# Patient Record
Sex: FEMALE | Race: WHITE
[De-identification: names, ages, dates, MRNs, and addresses within clinical notes are randomized per-mention and may not be internally consistent; named-entity substitution may affect disease eponyms.]

---

## 2017-01-11 ENCOUNTER — HOSPITAL ENCOUNTER (OUTPATIENT)
Dept: HOSPITAL 45 - C.GI | Age: 72
Discharge: HOME | End: 2017-01-11
Attending: INTERNAL MEDICINE
Payer: COMMERCIAL

## 2017-01-11 VITALS
WEIGHT: 150.31 LBS | HEIGHT: 64.02 IN | WEIGHT: 150.31 LBS | BODY MASS INDEX: 25.66 KG/M2 | HEIGHT: 64.02 IN | BODY MASS INDEX: 25.66 KG/M2

## 2017-01-11 VITALS — SYSTOLIC BLOOD PRESSURE: 117 MMHG | HEART RATE: 83 BPM | OXYGEN SATURATION: 97 % | DIASTOLIC BLOOD PRESSURE: 55 MMHG

## 2017-01-11 DIAGNOSIS — I10: ICD-10-CM

## 2017-01-11 DIAGNOSIS — K21.9: Primary | ICD-10-CM

## 2017-01-11 DIAGNOSIS — Z86.718: ICD-10-CM

## 2017-01-11 DIAGNOSIS — K22.2: ICD-10-CM

## 2017-01-11 DIAGNOSIS — K44.9: ICD-10-CM

## 2017-01-11 NOTE — ENDO HISTORY AND PHYSICAL
History & Physical


Date of Service:


Jan 11, 2017.


Chief Complaint:


Reflux


Referring Physician:


Dr. Swift


History of Present Illness


72 yo CF who presents for EGD secondary to GERD.





Past Surgical History


Hx Cardiac Surgery:  No


Hx Internal Defibrillator:  No


Hx Pacemaker:  No


Hx Abdominal Surgery:  No


Hx of Implantable Prosthesis:  No


Hx Post-Op Nausea and Vomiting:  No


Hx Cancer Surgery:  No


Hx Thoracic Surgery:  No


Hx Orthopedic:  No


Hx Urinary Tract Surgery:  No





Family History


None





Social History


Smoking Status:  Never Smoker


Hx Substance Use:  No


Hx Alcohol Use:  No





Allergies


Coded Allergies:  


     Ephedrine (Verified  Adverse Reaction, Unknown, CAUSES BLOOD PRESSURE TO 

GO UP, 1/10/17)





Current Medications





 Reported Home Medications








 Medications  Dose


 Route/Sig


 Max Daily Dose Days Date Category Dose


Instructions


 


 Simbrinza


  (Brinzolamide-Brimonidine


 Tartr) 1 Krysta Krysta  1 Drop


 OPL BID


    1/10/17 Reported 


 


 Zantac


  (Ranitidine HCl)


 150 Mg Tab  150 Mg


 PO BID


    1/10/17 Reported 


 


 Magnesium 250 mg


  (Magnesium) 1 Tab


 Tab  1 Tab


 PO QAM


    1/10/17 Reported 


 


 Zocor


  (Simvastatin) 20


 Mg Tab  20 Mg


 PO QPM


   90 11/17/16 Reported 


 


 Jantoven


  (Warfarin Sodium)


 2 Mg Tab  1 Mg


 PO 3XWK


    12/14/15 Reported 


 


 Coumadin


  (Warfarin Sodium)


 2 Mg Tab  2 Mg


 PO 4XWK


    12/8/15 Reported 


 


 Calcium 600 + D


  (Calcium


 Carbonate-Vitamin


 D) 1 Tab Tab  1 Tabs


 PO BID


    11/27/13 Reported 


 


 Cozaar (Losartan


 Potassium) 50 Mg


 Tab  50 Mg


 PO BID


    11/27/13 Reported  WILL TAKE IN PM DEPENDING ON BP











Vital Signs


Weight (Kilograms):  68.18


Height (Feet):  5


Height (Inches):  4











  Date Time  Temp Pulse Resp B/P Pulse Ox O2 Delivery O2 Flow Rate FiO2


 


1/11/17 11:43 36.7 96 20 165/69 95 Room Air  











Physical Exam


General Appearance:  WD/WN, no apparent distress


Respiratory/Chest:  


   Auscultation:  breath sounds normal


Cardiovascular:  


   Heart Auscultation:  RRR


Abdomen:  


   Bowel Sounds:  normal


   Inspection & Palpation:  soft, non-distended, no tenderness, guarding & 

rebound





Assessment and Plan


Assessment:


72 yo CF who presents for EGD secondary to GERD.








Plan:


Proceed with EGD.

## 2017-01-11 NOTE — GI REPORT
Procedure Date: 1/11/2017 12:14 PM

Procedure:            Upper GI endoscopy

Indications:          Follow-up of gastro-esophageal reflux disease

Medicines:            Monitored Anesthesia Care

Complications:        No immediate complications.

Estimated Blood Loss: Estimated blood loss: none.

Procedure:            Pre-Anesthesia Assessment:

                      - Prior to the procedure, a History and Physical was 

                      performed, and patient medications and allergies were 

                      reviewed. The patient's tolerance of previous 

                      anesthesia was also reviewed. The risks and benefits of 

                      the procedure and the sedation options and risks were 

                      discussed with the patient. All questions were 

                      answered, and informed consent was obtained. Prior 

                      Anticoagulants: The patient has taken Coumadin 

                      (warfarin), last dose was 5 days prior to procedure. 

                      ASA Grade Assessment: III - A patient with severe 

                      systemic disease. After reviewing the risks and 

                      benefits, the patient was deemed in satisfactory 

                      condition to undergo the procedure.

                      After obtaining informed consent, the endoscope was 

                      passed under direct vision. Throughout the procedure, 

                      the patient's blood pressure, pulse, and oxygen 

                      saturations were monitored continuously. The Scope was 

                      introduced through the mouth, and advanced to the 

                      second part of duodenum. The upper GI endoscopy was 

                      accomplished without difficulty. The patient tolerated 

                      the procedure well.

Findings:

     A mild Schatzki ring (acquired) was found at the gastroesophageal 

     junction.

     A small hiatus hernia was present.

     The examined duodenum was normal.

Impression:           - Mild Schatzki ring.

                      - Small hiatus hernia.

                      - Normal examined duodenum.

                      - No specimens collected.

Recommendation:       - Resume previous diet.

                      - Continue present medications.

                      - Repeat the upper endoscopy PRN for retreatment.

                      - Return to primary care physician as previously 

                      scheduled.

Christoph Martinez, DO

1/11/2017 12:43:24 PM

This report has been signed electronically.

Note Initiated On: 1/11/2017 12:14 PM

## 2017-01-11 NOTE — ANESTHESIOLOGY PROGRESS NOTE
Anesthesia Post Op Note


Date & Time


Jan 11, 2017 at 14:05





Vital Signs


Pain Intensity:  0





 Vital Signs Past 12 Hours








  Date Time  Temp Pulse Resp B/P Pulse Ox O2 Delivery O2 Flow Rate FiO2


 


1/11/17 13:28  83 18 117/55 97 Room Air  


 


1/11/17 13:17  85 18 110/58 97 Room Air  


 


1/11/17 13:02  90 18 115/55 97 Nasal Cannula 2 


 


1/11/17 12:48  92 18 114/54 95 Nasal Cannula 2 


 


1/11/17 11:43 36.7 96 20 165/69 95 Room Air  











Notes


Mental Status:  alert / awake / arousable, participated in evaluation


Pt Amnestic to Procedure:  Yes


Nausea / Vomiting:  adequately controlled


Pain:  adequately controlled


Airway Patency, RR, SpO2:  stable & adequate


BP & HR:  stable & adequate


Hydration State:  stable & adequate


Anesthetic Complications:  no major complications apparent

## 2017-01-20 ENCOUNTER — HOSPITAL ENCOUNTER (OUTPATIENT)
Dept: HOSPITAL 45 - C.LABSPEC | Age: 72
Discharge: HOME | End: 2017-01-20
Attending: FAMILY MEDICINE
Payer: COMMERCIAL

## 2017-01-20 ENCOUNTER — HOSPITAL ENCOUNTER (OUTPATIENT)
Dept: HOSPITAL 45 - C.RAD1850 | Age: 72
Discharge: HOME | End: 2017-01-20
Attending: FAMILY MEDICINE
Payer: COMMERCIAL

## 2017-01-20 DIAGNOSIS — R10.9: ICD-10-CM

## 2017-01-20 DIAGNOSIS — R05: ICD-10-CM

## 2017-01-20 DIAGNOSIS — R39.9: Primary | ICD-10-CM

## 2017-01-20 DIAGNOSIS — R10.9: Primary | ICD-10-CM

## 2017-01-20 LAB
APPEARANCE UR: CLEAR
BILIRUB UR-MCNC: (no result) MG/DL
COLOR UR: YELLOW
MANUAL MICROSCOPIC REQUIRED?: NO
NITRITE UR QL STRIP: (no result)
PH UR STRIP: 5.5 [PH] (ref 4.5–7.5)
REVIEW REQ?: NO
SP GR UR STRIP: 1.02 (ref 1–1.03)
UROBILINOGEN UR-MCNC: (no result) MG/DL
ZZUR CULT IF INDIC CLEAN CATCH: NO

## 2017-01-20 NOTE — DIAGNOSTIC IMAGING REPORT
CHEST 2 VIEWS ROUTINE



HISTORY:      R05 [id number] Acute flank pain



COMPARISON: Chest 12/24/2015.



FINDINGS: The lungs are clear. The heart is normal in size. No pneumothorax.

There is blunting of the bilateral costophrenic sulci posteriorly. Degenerative

changes within the thoracic spine.



IMPRESSION:



1. No focal lung consolidations.

2. Blunting of the bilateral posterior costophrenic sulci. This favors trace

bilateral pleural effusions.







Electronically signed by:  Dickson Hollis M.D.

1/20/2017 12:25 PM



Dictated Date/Time:  1/20/2017 12:23 PM

## 2017-06-10 ENCOUNTER — HOSPITAL ENCOUNTER (EMERGENCY)
Dept: HOSPITAL 45 - C.EDB | Age: 72
Discharge: HOME | End: 2017-06-10
Payer: COMMERCIAL

## 2017-06-10 VITALS — SYSTOLIC BLOOD PRESSURE: 136 MMHG | DIASTOLIC BLOOD PRESSURE: 68 MMHG | HEART RATE: 74 BPM

## 2017-06-10 VITALS — OXYGEN SATURATION: 97 %

## 2017-06-10 VITALS
BODY MASS INDEX: 24.61 KG/M2 | WEIGHT: 147.71 LBS | HEIGHT: 65 IN | HEIGHT: 65 IN | WEIGHT: 147.71 LBS | BODY MASS INDEX: 24.61 KG/M2

## 2017-06-10 VITALS — TEMPERATURE: 98.06 F

## 2017-06-10 DIAGNOSIS — Z79.01: ICD-10-CM

## 2017-06-10 DIAGNOSIS — Z51.81: ICD-10-CM

## 2017-06-10 DIAGNOSIS — F41.9: ICD-10-CM

## 2017-06-10 DIAGNOSIS — I51.9: ICD-10-CM

## 2017-06-10 DIAGNOSIS — I10: Primary | ICD-10-CM

## 2017-06-10 DIAGNOSIS — I26.99: ICD-10-CM

## 2017-06-10 DIAGNOSIS — Z82.49: ICD-10-CM

## 2017-06-10 LAB
ALBUMIN/GLOB SERPL: 1.1 {RATIO} (ref 0.9–2)
ALP SERPL-CCNC: 88 U/L (ref 45–117)
ALT SERPL-CCNC: 28 U/L (ref 12–78)
ANION GAP SERPL CALC-SCNC: 10 MMOL/L (ref 3–11)
AST SERPL-CCNC: 14 U/L (ref 15–37)
BASOPHILS # BLD: 0.05 K/UL (ref 0–0.2)
BASOPHILS NFR BLD: 0.8 %
BUN SERPL-MCNC: 16 MG/DL (ref 7–18)
BUN/CREAT SERPL: 15 (ref 10–20)
CALCIUM SERPL-MCNC: 9.3 MG/DL (ref 8.5–10.1)
CHLORIDE SERPL-SCNC: 109 MMOL/L (ref 98–107)
CO2 SERPL-SCNC: 24 MMOL/L (ref 21–32)
COMPLETE: YES
CREAT CL PREDICTED SERPL C-G-VRATE: 41 ML/MIN
CREAT SERPL-MCNC: 1.1 MG/DL (ref 0.6–1.2)
EOSINOPHIL NFR BLD AUTO: 253 K/UL (ref 130–400)
GLOBULIN SER-MCNC: 3.8 GM/DL (ref 2.5–4)
GLUCOSE SERPL-MCNC: 104 MG/DL (ref 70–99)
HCT VFR BLD CALC: 44 % (ref 37–47)
IG%: 0.3 %
IMM GRANULOCYTES NFR BLD AUTO: 36.7 %
INR PPP: 2.3 (ref 0.9–1.1)
LYMPHOCYTES # BLD: 2.21 K/UL (ref 1.2–3.4)
MCH RBC QN AUTO: 29.8 PG (ref 25–34)
MCHC RBC AUTO-ENTMCNC: 33.9 G/DL (ref 32–36)
MCV RBC AUTO: 88 FL (ref 80–100)
MONOCYTES NFR BLD: 8.5 %
NEUTROPHILS # BLD AUTO: 0.7 %
NEUTROPHILS NFR BLD AUTO: 53 %
PARTIAL THROMBOPLASTIN RATIO: 1.4
PMV BLD AUTO: 10.2 FL (ref 7.4–10.4)
POTASSIUM SERPL-SCNC: 3.7 MMOL/L (ref 3.5–5.1)
PROTHROMBIN TIME: 25.3 SECONDS (ref 9–12)
RBC # BLD AUTO: 5 M/UL (ref 4.2–5.4)
SODIUM SERPL-SCNC: 143 MMOL/L (ref 136–145)
WBC # BLD AUTO: 6.02 K/UL (ref 4.8–10.8)

## 2017-06-10 NOTE — EMERGENCY ROOM VISIT NOTE
History


Report prepared by Apple:  Carissa Bar


Under the Supervision of:  Dr. Ryan Friedman M.D.


First contact with patient:  11:04


Chief Complaint:  HYPERTENSION


Stated Complaint:  FLUCTUATIONS OF BLOOD PRESSURE, PRESSURE IN HEAD





History of Present Illness


The patient is a 72 year old female who presents to the Emergency Room with 

complaints of waxing and waning hypertension for the past week.  The patient 

reports a history of hypertension, noting that she takes Cozaar for her 

hypertension.  She states that over the last week her levels have been 

fluctuating.  The patient additionally associates weakness, fatigue, and head 

pressure with her symptoms today.  She additionally reports that she has a 

history of PE and DVTs, noting that she is on Coumadin.  The patient expresses 

concern for a new blood clot, and is unsure if that is why her blood pressure 

has risen.  She reports that typically can get her blood pressure under 

control.  The patient reports that on May 23rd she had a blood pressure of 200 

mmHg systolically.  She states that today it has fluctuated between 100 mmHg 

and 150 mmHg.  The patient reports normal bowel movements.  She associates back 

pain for the past few days.





   Source of History:  patient


   Onset:  past week


   Position:  other (global)


   Symptom Intensity:  100-150 mmHg


   Quality:  other (hypertension)


   Timing:  waxes/wanes


   Associated Symptoms:  + headache (pressure), + back pain, + fatigue, + 

weakness





Review of Systems


All systems have been listed, reviewed, and are negative other than those 

previously mentioned. Please see Additional Medical History Sheet.





Past Medical & Surgical


Medical Problems:


(1) Bilateral pulmonary embolism


(2) Heart disease


(3) Hypertension








Family History





FHx: heart disease


Hypertension





Social History


Smoking Status:  Never Smoker


Smokeless Tobacco Use:  No


Alcohol Use:  none


Marital Status:  


Housing Status:  lives with family


Occupation Status:  retired





Current/Historical Medications


Scheduled


Brinzolamide-Brimonidine Tartr (Simbrinza), 1 DROP OPL BID


Calcium Carbonate-Vitamin D (Calcium 600 + D), 1 TABS PO BID


Cholecalciferol (Vitamin D), 1 TAB PO BID


Losartan Potassium (Cozaar), 50 MG PO BID


Magnesium (Magnesium 250 mg), 1 TAB PO QAM


Ranitidine (Zantac), 150 MG PO BID


Ranitidine HCl (Ranitidine HCl), 150 MG PO BID


Simvastatin (Zocor), 20 MG PO QPM


Warfarin Sodium (Coumadin), 2 MG PO 4XWK


Warfarin Sodium (Warfarin Sodium), 1 MG PO 3XWK





Allergies


Coded Allergies:  


     Ephedrine (Verified  Adverse Reaction, Unknown, CAUSES BLOOD PRESSURE TO 

GO UP, 6/10/17)





Physical Exam


Vital Signs











  Date Time  Temp Pulse Resp B/P (MAP) Pulse Ox O2 Delivery O2 Flow Rate FiO2


 


6/10/17 13:33  74 14 136/68    


 


6/10/17 13:18  70 16     


 


6/10/17 13:03  65 15     


 


6/10/17 13:02    133/70    


 


6/10/17 12:48  74 25     


 


6/10/17 12:43  80 13     


 


6/10/17 12:28  81 22  97   


 


6/10/17 12:13  80 17  97   


 


6/10/17 12:02    138/85    


 


6/10/17 11:58  77 23  98   


 


6/10/17 11:28  81 19     


 


6/10/17 11:23    154/96    


 


6/10/17 11:20  91 17     


 


6/10/17 11:05  85 19     


 


6/10/17 10:50  91 14     


 


6/10/17 10:40    150/88    


 


6/10/17 10:35  93 15     


 


6/10/17 10:24  103      


 


6/10/17 10:20  105 14     


 


6/10/17 10:11 36.7 112 17 140/75 99 Room Air  











Physical Exam


GENERAL: Patient awake, alert, oriented x 3.  Patient is very anxious.  Patient 

follows commands.  Patient does not appear toxic.  Patient is adequately 

hydrated and well-nourished.  


SKIN: No erythema, pallor, cyanosis or rash


HEENT: Normal head, pupils equal, reactive to light and accommodation.  Ears 

normal.  Oral cavity and posterior pharynx appear normal.  Neck: Without 

adenopathy, no neck vein distention.


LUNGS: Clear to auscultation.  No wheezes, no rales, no rhonchi.


HEART:  No murmurs. No gallops. No rubs


ABDOMEN: No masses, no rebound, no hepatomegaly or splenomegaly.


BACK: Left upper back pain to palpation.


EXTREMITIES: No signs of trauma.  No pedal or pretibial edema.  No calf or 

thigh tenderness.


NEUROLOGIC: Cranial nerves II-XII within normal limits.  No gross motor sensory 

function deficits.


PSYCH: Patient is very anxious.





Medical Decision & Procedures


ER Provider


Diagnostic Interpretation:


X ray results are stated below per my interpretation and the radiologist's 

interpretation. 





CHEST 2 VIEWS ROUTINE





CLINICAL HISTORY: Left upper back pain.    





COMPARISON STUDY:  Chest radiograph January 20, 2017.





FINDINGS: Lung volumes are normal. There is no pneumothorax or pleural effusion.


Cardiac size is normal. There is no evidence of pulmonary edema. Blunting of the


bilateral costophrenic angles suggests trace bilateral pleural effusions. There


is no consolidation. 





IMPRESSION:  





1. No consolidation to suggest pneumonia. 





2. No pneumothorax.





3. Suspected trace bilateral pleural effusions.





Electronically signed by:  Arian Alamo M.D.


6/10/2017 12:10 PM





Dictated Date/Time:  6/10/2017 12:08 PM





Laboratory Results


6/10/17 10:38








Red Blood Count 5.00, Mean Corpuscular Volume 88.0, Mean Corpuscular Hemoglobin 

29.8, Mean Corpuscular Hemoglobin Concent 33.9, Mean Platelet Volume 10.2, 

Neutrophils (%) (Auto) 53.0, Lymphocytes (%) (Auto) 36.7, Monocytes (%) (Auto) 

8.5, Eosinophils (%) (Auto) 0.7, Basophils (%) (Auto) 0.8, Neutrophils # (Auto) 

3.19, Lymphocytes # (Auto) 2.21, Monocytes # (Auto) 0.51, Eosinophils # (Auto) 

0.04, Basophils # (Auto) 0.05





6/10/17 10:38

















Test


  6/10/17


10:38


 


White Blood Count


  6.02 K/uL


(4.8-10.8)


 


Red Blood Count


  5.00 M/uL


(4.2-5.4)


 


Hemoglobin


  14.9 g/dL


(12.0-16.0)


 


Hematocrit 44.0 % (37-47) 


 


Mean Corpuscular Volume


  88.0 fL


()


 


Mean Corpuscular Hemoglobin


  29.8 pg


(25-34)


 


Mean Corpuscular Hemoglobin


Concent 33.9 g/dl


(32-36)


 


Platelet Count


  253 K/uL


(130-400)


 


Mean Platelet Volume


  10.2 fL


(7.4-10.4)


 


Neutrophils (%) (Auto) 53.0 % 


 


Lymphocytes (%) (Auto) 36.7 % 


 


Monocytes (%) (Auto) 8.5 % 


 


Eosinophils (%) (Auto) 0.7 % 


 


Basophils (%) (Auto) 0.8 % 


 


Neutrophils # (Auto)


  3.19 K/uL


(1.4-6.5)


 


Lymphocytes # (Auto)


  2.21 K/uL


(1.2-3.4)


 


Monocytes # (Auto)


  0.51 K/uL


(0.11-0.59)


 


Eosinophils # (Auto)


  0.04 K/uL


(0-0.5)


 


Basophils # (Auto)


  0.05 K/uL


(0-0.2)


 


RDW Standard Deviation


  44.4 fL


(36.4-46.3)


 


RDW Coefficient of Variation


  13.7 %


(11.5-14.5)


 


Immature Granulocyte % (Auto) 0.3 % 


 


Immature Granulocyte # (Auto)


  0.02 K/uL


(0.00-0.02)


 


Prothrombin Time


  25.3 SECONDS


(9.0-12.0)


 


Prothromb Time International


Ratio 2.3 (0.9-1.1) 


 


 


Activated Partial


Thromboplast Time 36.2 SECONDS


(21.0-31.0)


 


Partial Thromboplastin Ratio 1.4 


 


Anion Gap


  10.0 mmol/L


(3-11)


 


Est Creatinine Clear Calc


Drug Dose 41.0 ml/min 


 


 


Estimated GFR (


American) 57.7 


 


 


Estimated GFR (Non-


American 49.8 


 


 


BUN/Creatinine Ratio 15.0 (10-20) 


 


Calcium Level


  9.3 mg/dl


(8.5-10.1)


 


Total Bilirubin


  1.5 mg/dl


(0.2-1)


 


Aspartate Amino Transf


(AST/SGOT) 14 U/L (15-37) 


 


 


Alanine Aminotransferase


(ALT/SGPT) 28 U/L (12-78) 


 


 


Alkaline Phosphatase


  88 U/L


()


 


Troponin I


  < 0.015 ng/ml


(0-0.045)


 


Total Protein


  7.9 gm/dl


(6.4-8.2)


 


Albumin


  4.1 gm/dl


(3.4-5.0)


 


Globulin


  3.8 gm/dl


(2.5-4.0)


 


Albumin/Globulin Ratio 1.1 (0.9-2) 








Laboratory results as stated above per my review.





ECG


Indication:  other (hypertension)


Rate (beats per minute):  99


Rhythm:  normal sinus


Findings:  no acute ischemic change, no ectopy





ED Course


1105: Past medical records reviewed. The patient was evaluated in room B10. A 

complete history and physical examination was performed. 





1235: I reevaluated the patient and she is still very anxious.  I discussed the 

exam findings with her and her daughter and I discussed the treatment plan.  

They verbalized complete understanding and agreement.  The patient is ready to 

go home.





Medical Decision


Nurses notes reviewed. Medical history sheet reviewed. Differential diagnosis 

includes but is not limited to: anxiety, hypertension, history of DVT and PE.





The patient is extremely anxious.  She has been adjusting her losartan and 

diuretic according to her blood pressure.  Testing today including multiple 

blood tests, EKG and imaging did not reveal any significant pathology.  She 

does have some very small bilateral pleural effusions which I do not believe 

require treatment.  I have attempted to reassure the patient.  I told the 

patient that she should take the same antihypertensive and diuretic daily 

unless she has significant peripheral edema.  The patient was also encouraged 

to follow-up with her family physician.





Blood Pressure Screening: Patient was found to have an elevated blood pressure 

and was referred to their primary doctor for recheck and further treatment.





Medication Reconciliation: I attest that I have personally reviewed the patient'

s current medication list.





Impression





 Primary Impression:  


 Hypertension


 Additional Impression:  


 Anxiety





Scribe Attestation


The scribe's documentation has been prepared under my direction and personally 

reviewed by me in its entirety. I confirm that the note above accurately 

reflects all work, treatment, procedures, and medical decision making performed 

by me.





Departure Information


Dispostion


Home / Self-Care





Referrals


Black Swift M.D. (PCP)





Forms


HOME CARE DOCUMENTATION FORM,                                                 

               IMPORTANT VISIT INFORMATION





Patient Instructions


My [facility]





Additional Instructions





Take the same amount of losartan and diuretic on a daily basis.


Follow-up with Dr. Swift within the next 10 days.





Problem Qualifiers

## 2017-06-10 NOTE — DIAGNOSTIC IMAGING REPORT
CHEST 2 VIEWS ROUTINE



CLINICAL HISTORY: Left upper back pain.    



COMPARISON STUDY:  Chest radiograph January 20, 2017.



FINDINGS: Lung volumes are normal. There is no pneumothorax or pleural effusion.

Cardiac size is normal. There is no evidence of pulmonary edema. Blunting of the

bilateral costophrenic angles suggests trace bilateral pleural effusions. There

is no consolidation. 



IMPRESSION:  



1. No consolidation to suggest pneumonia. 



2. No pneumothorax.



3. Suspected trace bilateral pleural effusions.









Electronically signed by:  Arian Alamo M.D.

6/10/2017 12:10 PM



Dictated Date/Time:  6/10/2017 12:08 PM

## 2017-09-22 ENCOUNTER — HOSPITAL ENCOUNTER (EMERGENCY)
Dept: HOSPITAL 45 - C.EDB | Age: 72
Discharge: HOME | End: 2017-09-22
Payer: COMMERCIAL

## 2017-09-22 VITALS
HEIGHT: 64.02 IN | HEIGHT: 64.02 IN | BODY MASS INDEX: 26.16 KG/M2 | WEIGHT: 153.22 LBS | WEIGHT: 153.22 LBS | BODY MASS INDEX: 26.16 KG/M2

## 2017-09-22 VITALS — OXYGEN SATURATION: 98 % | SYSTOLIC BLOOD PRESSURE: 167 MMHG | HEART RATE: 84 BPM | DIASTOLIC BLOOD PRESSURE: 99 MMHG

## 2017-09-22 VITALS — TEMPERATURE: 97.88 F

## 2017-09-22 DIAGNOSIS — I10: ICD-10-CM

## 2017-09-22 DIAGNOSIS — L08.9: Primary | ICD-10-CM

## 2017-09-22 DIAGNOSIS — Z79.01: ICD-10-CM

## 2017-09-22 DIAGNOSIS — Z86.711: ICD-10-CM

## 2017-09-22 DIAGNOSIS — Z82.49: ICD-10-CM

## 2017-09-22 LAB
LYME DISEASE AB IGG: (no result)
LYME DISEASE AB IGM: (no result)

## 2017-09-22 NOTE — EMERGENCY ROOM VISIT NOTE
History


Report prepared by Apple:  Carissa Bar


Under the Supervision of:  AARON GrimesO.


First contact with patient:  09:33


Chief Complaint:  BITE


Stated Complaint:  INSECT BITE/ITCHING/PAINFUL





History of Present Illness


The patient is a 72 year old female who presents to the Emergency Room with 

complaints of a sudden insect bite to her right leg that occurred several days 

ago.  The patient reports that she saw the bite shadi on Tuesday and states that 

she developed a rash and pain to the area.  The patient states that she is 

concerned about Lyme Disease.  She states that she does not know what bit her.  

The patient states that she takes Coumadin, noting that her INR yesterday was 

2.1.  Pt denies headache, change in vision, fevers, chest pain, shortness of 

breath, nausea, vomiting, diarrhea, and pain with urination.





   Source of History:  patient


   Onset:  several days ago


   Position:  leg (right)


   Quality:  other (insect bite)


   Timing:  other (sudden)


   Associated Symptoms:  + rash





Review of Systems


See HPI for pertinent positives & negatives. A total of 10 systems reviewed and 

were otherwise negative.





Past Medical & Surgical


Medical Problems:


(1) Bilateral pulmonary embolism


(2) Heart disease


(3) Hypertension








Family History





FHx: heart disease


Hypertension





Social History


Smoking Status:  Never Smoker


Alcohol Use:  none


Marital Status:  


Housing Status:  lives with family


Occupation Status:  retired





Current/Historical Medications


Scheduled


Brinzolamide-Brimonidine Tartr (Simbrinza), 1 DROP OPL BID


Calcium Carbonate-Vitamin D (Calcium 600 + D), 1 TABS PO BID


Cholecalciferol (Vitamin D), 1 TAB PO BID


Magnesium (Magnesium 250 mg), 1 TAB PO QAM


Ranitidine (Zantac), 150 MG PO BID


Simvastatin (Zocor), 20 MG PO QPM


Warfarin Sodium (Coumadin), 2 MG PO 3XWK


Warfarin Sodium (Warfarin Sodium), 1 MG PO 4XWK





Allergies


Coded Allergies:  


     Ephedrine (Verified  Adverse Reaction, Unknown, CAUSES BLOOD PRESSURE TO 

GO UP, 9/22/17)





Physical Exam


Vital Signs











  Date Time  Temp Pulse Resp B/P (MAP) Pulse Ox O2 Delivery O2 Flow Rate FiO2


 


9/22/17 11:41  84 18 167/99 98 Room Air  


 


9/22/17 10:31  86 18 151/91 97 Room Air  


 


9/22/17 09:25 36.6 110 17 171/104 98 Room Air  











Physical Exam


GENERAL: alert, well appearing, well nourished, no distress, non-toxic 


LUNGS: Clear to auscultation. Normal chest wall mechanics


HEART: no murmurs, S1 normal and S2 normal 


ABDOMEN: abdomen soft, non-tender, normo-active bowel sounds, no masses, no 

rebound or guarding. 


SKIN: At the right posterior popliteal fossa there is a 1.5 cm x 1.5 cm area of 

erythema with a central pustule.


UPPER EXTREMITIES: upper extremities are grossly normal. 


LOWER EXTREMITIES: No pitting edema.


NEURO EXAM: Normal sensorium.





Medical Decision & Procedures


Laboratory Results











Test


  9/22/17


10:29


 


Lyme Disease IgG Antibody NEG (NEG) 


 


Lyme Disease IgM Antibody NEG (NEG) 








Laboratory results per my review.





ED Course


ED COURSE: 


Vital signs were reviewed and showed hypertensive


The patients medical record was reviewed


The above diagnostic studies were performed and reviewed.


ED treatments and interventions as stated above. 





0944: The patient was evaluated in room B5. A complete history and physical 

examination was performed.





1202: Upon reevaluation, the patient is resting comfortably.I discussed my 

findings with the patient and she understands and agrees with the treatment 

plan.   


Based on the patients age, coexisting illnesses, exam and lab findings the 

decision to treat as an outpatient was made.


The patient remained stable while under my care.


The patient appeared well at the time of discharge.





Medical Decision


Differential diagnosis includes etiologies such as cellulitis, abscess, MRSA 

infection, DVT, necrotizing fasciitis, dermatitis, drug eruption, as well as 

others were entertained.





Patient is a 72-year-old female that presents to ER for erythema and concern of 

a tick bite in her right popliteal fossa.  On exam he does appear to be a 

pustule.  Lyme was obtained and was negative.  Pustule was broken open.  She is 

discharged follow-up with her PCP.  There is no significant surrounding 

erythema without no benefit of antibiotics at this time. Discussed with Pt 

concerning signs and symptoms to watch out for. Pt was instructed to follow up 

with their PCP and discussed with the patient their option to return to the ED 

at anytime for persistent or worsening symptoms. The appropriate anticipatory 

guidance and out-patient management, including indications for return to the 

emergency department, were explained at length to the patient and understood.





Medication Reconcilliation


Current Medication List:  was personally reviewed by me





Blood Pressure Screening


Patient's blood pressure:  Elevated blood pressure


Blood pressure disposition:  Elevated BP felt to be situational, Did not 

require urgent referral





Impression





 Primary Impression:  


 Pustule





Scribe Attestation


The scribe's documentation has been prepared under my direction and personally 

reviewed by me in its entirety. I confirm that the note above accurately 

reflects all work, treatment, procedures, and medical decision making performed 

by me.





Departure Information


Dispostion


Home / Self-Care





Referrals


[facility] M.D. (PCP)





Forms


HOME CARE DOCUMENTATION FORM,                                                 

               IMPORTANT VISIT INFORMATION





Patient Instructions


ED Cyst Sebaceous Infec Ke[facility], My [facility]





Additional Instructions





You were found to have a small skin pustule which did open up.  His keep this 

clean dry.  If you have any surrounding spreading of the redness, fevers or any 

other concerning signs or symptoms please return to the ER immediately.

## 2017-10-05 ENCOUNTER — HOSPITAL ENCOUNTER (OUTPATIENT)
Dept: HOSPITAL 45 - C.ULTR | Age: 72
Discharge: HOME | End: 2017-10-05
Attending: PHYSICIAN ASSISTANT
Payer: COMMERCIAL

## 2017-10-05 DIAGNOSIS — R17: Primary | ICD-10-CM

## 2017-10-05 NOTE — DIAGNOSTIC IMAGING REPORT
ABDOMINAL ULTRASOUND, RIGHT UPPER QUADRANT



HISTORY:     Elevated bilirubin.



COMPARISON:  Abdominal ultrasound April 16, 2015, CT of the abdomen and pelvis

December 15, 2015 and MRI of the abdomen December 20, 2016.



FINDINGS: Liver morphology is normal. There is no biliary ductal dilatation. The

common bile duct measures 6 mm in caliber. There are are no gallstones. A few

small gallbladder polyps are noted. There is no gallbladder wall thickening. The

pancreatic body is normal. The head and tail are partially obscured. There is no

right hydronephrosis. A 9 mm right renal cyst is noted.



IMPRESSION: 





No gallstones or biliary ductal dilatation.







Electronically signed by:  Arian Alamo M.D.

10/5/2017 10:42 AM



Dictated Date/Time:  10/5/2017 10:40 AM

## 2017-10-13 ENCOUNTER — HOSPITAL ENCOUNTER (OUTPATIENT)
Dept: HOSPITAL 45 - C.NUCL | Age: 72
Discharge: HOME | End: 2017-10-13
Attending: PHYSICIAN ASSISTANT
Payer: COMMERCIAL

## 2017-10-13 ENCOUNTER — HOSPITAL ENCOUNTER (EMERGENCY)
Dept: HOSPITAL 45 - C.EDB | Age: 72
Discharge: HOME | End: 2017-10-13
Payer: COMMERCIAL

## 2017-10-13 VITALS — HEART RATE: 92 BPM | OXYGEN SATURATION: 98 % | SYSTOLIC BLOOD PRESSURE: 169 MMHG | DIASTOLIC BLOOD PRESSURE: 96 MMHG

## 2017-10-13 VITALS
HEIGHT: 64.02 IN | WEIGHT: 147.71 LBS | HEIGHT: 64.02 IN | WEIGHT: 147.71 LBS | BODY MASS INDEX: 25.22 KG/M2 | BODY MASS INDEX: 25.22 KG/M2

## 2017-10-13 VITALS — TEMPERATURE: 97.34 F

## 2017-10-13 DIAGNOSIS — R10.9: ICD-10-CM

## 2017-10-13 DIAGNOSIS — R93.2: ICD-10-CM

## 2017-10-13 DIAGNOSIS — I10: ICD-10-CM

## 2017-10-13 DIAGNOSIS — I49.3: Primary | ICD-10-CM

## 2017-10-13 DIAGNOSIS — Z86.711: ICD-10-CM

## 2017-10-13 DIAGNOSIS — Z79.01: ICD-10-CM

## 2017-10-13 DIAGNOSIS — R19.7: ICD-10-CM

## 2017-10-13 DIAGNOSIS — E80.6: Primary | ICD-10-CM

## 2017-10-13 LAB
ALP SERPL-CCNC: 86 U/L (ref 45–117)
ALT SERPL-CCNC: 32 U/L (ref 12–78)
ANION GAP SERPL CALC-SCNC: 6 MMOL/L (ref 3–11)
AST SERPL-CCNC: 26 U/L (ref 15–37)
BUN SERPL-MCNC: 16 MG/DL (ref 7–18)
BUN/CREAT SERPL: 14.5 (ref 10–20)
CALCIUM SERPL-MCNC: 9.5 MG/DL (ref 8.5–10.1)
CHLORIDE SERPL-SCNC: 106 MMOL/L (ref 98–107)
CO2 SERPL-SCNC: 27 MMOL/L (ref 21–32)
CREAT CL PREDICTED SERPL C-G-VRATE: 42.9 ML/MIN
CREAT SERPL-MCNC: 1.1 MG/DL (ref 0.6–1.2)
EOSINOPHIL NFR BLD AUTO: 276 K/UL (ref 130–400)
GLUCOSE SERPL-MCNC: 108 MG/DL (ref 70–99)
HCT VFR BLD CALC: 45.3 % (ref 37–47)
INR PPP: 3.4 (ref 0.9–1.1)
MCH RBC QN AUTO: 29.5 PG (ref 25–34)
MCHC RBC AUTO-ENTMCNC: 33.8 G/DL (ref 32–36)
MCV RBC AUTO: 87.3 FL (ref 80–100)
PARTIAL THROMBOPLASTIN RATIO: 1.6
PMV BLD AUTO: 10.4 FL (ref 7.4–10.4)
POTASSIUM SERPL-SCNC: 3.7 MMOL/L (ref 3.5–5.1)
PROTHROMBIN TIME: 38.8 SECONDS (ref 9–12)
RBC # BLD AUTO: 5.19 M/UL (ref 4.2–5.4)
SODIUM SERPL-SCNC: 139 MMOL/L (ref 136–145)
TSH SERPL-ACNC: 3.84 UIU/ML (ref 0.3–4.5)
WBC # BLD AUTO: 8.18 K/UL (ref 4.8–10.8)

## 2017-10-13 NOTE — DIAGNOSTIC IMAGING REPORT
NUCLEAR MEDICINE HEPATOBILIARY SCAN WITH GALLBLADDER EJECTION FRACTION



CLINICAL HISTORY:  Abdominal pain.



COMPARISON:  Right upper quadrant ultrasound October 5, 2017.



TECHNIQUE:  5.149 mCi of technetium 99m Choletec IV was injected at 10:50 AM on

October 13, 2017.  Immediately following injection, imaging of the abdomen was

carried out for 60 minutes in the anterior projection. At this time, 1.3 mcg of

Sincalide was injected IV as per protocol. Imaging was performed for an

additional 45 minutes to estimate a gallbladder ejection fraction.



FINDINGS:  Hepatic uptake of radiotracer is prompt and homogeneous. Activity is

identified within the common bile duct at 10 minutes. Small bowel activity is

first noted at 20 minutes. Gallbladder activity is first identified at 40

minutes. Gallbladder ejection fraction was estimated at 83%. This is within

normal limits. Normal is greater than 30-35%.



IMPRESSION:  



1. Normal hepatobiliary scan. No evidence of acute or chronic cholecystitis.



2. Normal gallbladder ejection fraction of 83%.







Electronically signed by:  Arian Alamo M.D.

10/13/2017 1:10 PM



Dictated Date/Time:  10/13/2017 1:07 PM

## 2017-10-13 NOTE — EMERGENCY ROOM VISIT NOTE
History


Report prepared by Apple:  Arnulfo Brooke


Under the Supervision of:  Dr. Gita Carbajal D.O.


First contact with patient:  01:13


Chief Complaint:  CARDIAC ASSESSMENT


Stated Complaint:  CARDIO ASSESSMENT





History of Present Illness


The patient is a 72 year old female who presents to the Emergency Room with 

complaints of a constant irregular heartbeat that began at 1700. She states 

that she saw her doctor today and her INR level was 4.6. The patient reports 

that she normally takes 300 mcg of Vitamin K three times a week. She states 

that her doctor gave her an additional 100 mcg due to her elevated INR level. 

She reports that she took her Vitamin K at 1600 and started to experience an 

irregular heartbeat an hour later. The patient admits to a history of pulmonary 

emboli. She states that she has been experiencing diarrhea for a while, but 

admits that it is not as severe currently as it was in the past. The patient 

admits to being on Coumadin. The patient denies chest pain, shortness of breath

, cough, cold, abdominal pain, and a history of a heart attack.





   Source of History:  patient


   Onset:  1700


   Position:  other (global)


   Quality:  other (irregular heartbeat)


   Timing:  constant


   Associated Symptoms:  + diarrhea, No cough, No chest pain, No SOB, No 

abdominal pain





Review of Systems


See HPI for pertinent positives & negatives. A total of 10 systems reviewed and 

were otherwise negative.





Past Medical & Surgical


Medical Problems:


(1) Bilateral pulmonary embolism


(2) Heart disease


(3) Hypertension








Family History





FHx: heart disease


Hypertension





Social History


Smoking Status:  Never Smoker


Alcohol Use:  none


Marital Status:  


Housing Status:  lives with family


Occupation Status:  retired





Current/Historical Medications


Scheduled


Brinzolamide-Brimonidine Tartr (Simbrinza), 1 DROP OPL BID


Calcium Carbonate-Vitamin D (Calcium 600 + D), 1 TABS PO BID


Cholecalciferol (Vitamin D), 1 TAB PO BID


Magnesium (Magnesium 250 mg), 1 TAB PO QAM


Phytonadione (Vitamin K), 100 MCG PO 3XWK


Ranitidine (Zantac), 150 MG PO BID


Simvastatin (Zocor), 20 MG PO QPM


Warfarin Sodium (Coumadin), 2 MG PO 2XWK


Warfarin Sodium (Warfarin Sodium), 1 MG PO 5XWK





Allergies


Coded Allergies:  


     Ephedrine (Verified  Adverse Reaction, Unknown, CAUSES BLOOD PRESSURE TO 

GO UP, 10/13/17)





Physical Exam


Vital Signs











  Date Time  Temp Pulse Resp B/P (MAP) Pulse Ox O2 Delivery O2 Flow Rate FiO2


 


10/13/17 02:57  92 18 169/96 98   


 


10/13/17 01:20  100      


 


10/13/17 01:10 36.3 79 18 194/105 99 Room Air  











Physical Exam


HEENT: Head - normocephalic and atraumatic   Pupils are equal, round, and 

reactive to light.  Extraocular eye muscles are intact, and sclera are 

anicteric.   Nose -  moist nasal mucosa without discharge. Mouth - moist buccal 

mucosa.  Oropharynx is nonerythematous and there is no tonsillar exudate or 

edema noted.


Neck: Supple; no JVD, nuchal rigidity, cervical lymphadenopathy.


Heart: Irregular heart beat.  Regular Rate.  a normal S1 and S2 with no murmurs

, clicks, or gallops appreciated.


Lungs: Clear to auscultation bilaterally with no wheezes, rales, or rhonchi.


Abdomen: Soft, completely nontender, nondistended, with good bowel sounds.  

There are no palpable pulsatile masses or hepatosplenomegaly.  There is no 

guarding, rigidity, or rebound noted.


Extremities: No evidence of cyanosis, clubbing, or edema.  There are easily 

palpable peripheral pulses.


Skin: warm and dry with good turgor and no rashes.





Medical Decision & Procedures


Laboratory Results


10/13/17 01:20








10/13/17 01:20

















Test


  10/13/17


01:20


 


Red Blood Count


  5.19 M/uL


(4.2-5.4)


 


Mean Corpuscular Volume


  87.3 fL


()


 


Mean Corpuscular Hemoglobin


  29.5 pg


(25-34)


 


Mean Corpuscular Hemoglobin


Concent 33.8 g/dl


(32-36)


 


RDW Standard Deviation


  42.5 fL


(36.4-46.3)


 


RDW Coefficient of Variation


  13.4 %


(11.5-14.5)


 


Mean Platelet Volume


  10.4 fL


(7.4-10.4)


 


Prothrombin Time


  38.8 SECONDS


(9.0-12.0)


 


Prothromb Time International


Ratio 3.4 (0.9-1.1) 


 


 


Activated Partial


Thromboplast Time 42.7 SECONDS


(21.0-31.0)


 


Partial Thromboplastin Ratio 1.6 


 


Anion Gap


  6.0 mmol/L


(3-11)


 


Est Creatinine Clear Calc


Drug Dose 42.9 ml/min 


 


 


Estimated GFR (


American) 57.7 


 


 


Estimated GFR (Non-


American 49.8 


 


 


BUN/Creatinine Ratio 14.5 (10-20) 


 


Calcium Level


  9.5 mg/dl


(8.5-10.1)


 


Total Bilirubin


  1.2 mg/dl


(0.2-1)


 


Direct Bilirubin


  0.2 mg/dl


(0-0.2)


 


Aspartate Amino Transf


(AST/SGOT) 26 U/L (15-37) 


 


 


Alanine Aminotransferase


(ALT/SGPT) 32 U/L (12-78) 


 


 


Alkaline Phosphatase


  86 U/L


()


 


Creatine Kinase MB


  2.1 ng/ml


(0.5-3.6)


 


Creatine Kinase MB Ratio  (0-3.0) 


 


Troponin I


  < 0.015 ng/ml


(0-0.045)


 


Pro-B-Type Natriuretic Peptide


  400 pg/ml


(0-900)


 


Total Protein


  8.2 gm/dl


(6.4-8.2)


 


Albumin


  4.3 gm/dl


(3.4-5.0)


 


Thyroid Stimulating Hormone


(TSH) 3.840 uIu/ml


(0.300-4.500)


 


Chemistry Specimen Hemolysis  





Laboratory results per my review.





ECG


Indication:  other (irregular heartbeat)


Rate (beats per minute):  94


Rhythm:  normal sinus


Findings:  no acute ischemic change, other (frequent PVCs)





ED Course


  0117: The patient was evaluated in room A02. A complete history and physical 

examination were performed. Nursing notes and previous electronic medical 

records were reviewed.  IV lock was established and labs were drawn as above.  

The patient was observed on the cardiac monitor and pulse oximeter.  A twelve-

lead EKG was obtained as described above.  





0244: I reevaluated the patient and she feels better. Her PVCs are still 

present but less frequent. I discussed findings and results with her. The 

patient verbalized agreement of the treatment plan. She was discharged home.





Medical Decision


The patient is a 72 year old female who presents to the ED with palpitations 

after that began at 1700. Differential diagnosis includes electrolyte imbalance

, hyperthyroidism,  Cardiac dysrhythmia, and dehydration. 





Lab results show: Normal white count, stable H&H, Normal TSH, Cardiac enzyme 

negative, glucose 108, normal renal function, LFTs normal, INR 3.4.





 the patient presents to the emergency department after feeling an irregular 

heartbeat.  She thought that it may have something to do with taking the extra 

vitamin K.  She has no associated chest pain or shortness of breath.  She is 

not lightheaded.








encouraged the patient to follow-up with her PCP if the palpitations do not 

resolve. 


If she develops worsening symptoms, she should return here to the ER.





Medication Reconcilliation


Current Medication List:  was personally reviewed by me





Blood Pressure Screening


Patient's blood pressure:  Elevated blood pressure


Blood pressure disposition:  Elevated BP felt to be situational





Impression





 Primary Impression:  


 PVC's (premature ventricular contractions)





Scribe Attestation


The scribe's documentation has been prepared under my direction and personally 

reviewed by me in its entirety. I confirm that the note above accurately 

reflects all work, treatment, procedures, and medical decision making performed 

by me.





Departure Information


Dispostion


Home / Self-Care





Referrals


Black Swift M.D. (PCP)





Forms


IMPORTANT VISIT INFORMATION





Patient Instructions


My [facility], Premature Ventricular Contract About, Premature 

Ventricular Contract Tx





Additional Instructions





Rest.





Keep yourself well-hydrated.





Return to the ER if you develop shortness of breath, chest pain, or 

lightheadedness

## 2017-12-11 ENCOUNTER — HOSPITAL ENCOUNTER (OUTPATIENT)
Dept: HOSPITAL 45 - C.MTU | Age: 72
Discharge: HOME | End: 2017-12-11
Attending: SPECIALIST
Payer: COMMERCIAL

## 2017-12-11 VITALS
HEIGHT: 64.02 IN | WEIGHT: 141.1 LBS | WEIGHT: 141.1 LBS | HEIGHT: 64.02 IN | BODY MASS INDEX: 24.09 KG/M2 | BODY MASS INDEX: 24.09 KG/M2

## 2017-12-11 VITALS
HEART RATE: 70 BPM | DIASTOLIC BLOOD PRESSURE: 80 MMHG | TEMPERATURE: 97.7 F | SYSTOLIC BLOOD PRESSURE: 167 MMHG | OXYGEN SATURATION: 97 %

## 2017-12-11 DIAGNOSIS — R14.0: Primary | ICD-10-CM

## 2017-12-19 ENCOUNTER — HOSPITAL ENCOUNTER (OUTPATIENT)
Dept: HOSPITAL 45 - C.MAMM | Age: 72
Discharge: HOME | End: 2017-12-19
Attending: OBSTETRICS & GYNECOLOGY
Payer: COMMERCIAL

## 2017-12-19 DIAGNOSIS — Z12.31: Primary | ICD-10-CM

## 2017-12-20 NOTE — MAMMOGRAPHY REPORT
BILATERAL DIGITAL SCREENING MAMMOGRAM TOMOSYNTHESIS WITH CAD: 12/19/2017

CLINICAL HISTORY: Routine screening.  





TECHNIQUE:  Breast tomosynthesis in addition to standard 2D mammography was performed. Current study 
was also evaluated with a Computer Aided Detection (CAD) system.  



COMPARISON: Comparison is made to exams dated:  12/22/2015 mammogram, 9/24/2013 mammogram, 8/4/2011 [facility], 1/16/2009, and 1/2/2008.   



BREAST COMPOSITION:  There are scattered areas of fibroglandular density in both breasts.  



FINDINGS: There is a stable benign rim calcification in the upper outer left breast.  No suspicious m
ass, architectural distortion or cluster of suspicious microcalcifications is seen.  



IMPRESSION:  ACR BI-RADS CATEGORY 1: NEGATIVE

There is no mammographic evidence of malignancy. A 1 year screening mammogram is recommended.  The pa
tient will receive written notification of the results.  





Approximately 10% of breast cancers are not detected with mammography. A negative mammographic report
 should not delay biopsy if a clinically suggestive mass is present.



Eufemia Escobar M.D.          

ay/:12/19/2017 16:15:54  



Imaging Technologist: Titi Block RT(R)(M), [facility]

letter sent: Normal 1/2  

BI-RADS Code: ACR BI-RADS Category 1: Negative

## 2018-01-15 ENCOUNTER — HOSPITAL ENCOUNTER (OUTPATIENT)
Dept: HOSPITAL 45 - C.ULTR | Age: 73
Discharge: HOME | End: 2018-01-15
Attending: UROLOGY
Payer: COMMERCIAL

## 2018-01-15 DIAGNOSIS — N26.1: ICD-10-CM

## 2018-01-15 DIAGNOSIS — N28.1: Primary | ICD-10-CM

## 2018-03-26 ENCOUNTER — HOSPITAL ENCOUNTER (OUTPATIENT)
Dept: HOSPITAL 45 - C.LAB | Age: 73
Discharge: HOME | End: 2018-03-26
Attending: INTERNAL MEDICINE
Payer: COMMERCIAL

## 2018-03-26 DIAGNOSIS — E55.9: ICD-10-CM

## 2018-03-26 DIAGNOSIS — E78.5: Primary | ICD-10-CM

## 2018-03-26 LAB
KETONES UR QL STRIP: 77 MG/DL
PH UR: 160 MG/DL (ref 0–200)

## 2018-04-23 ENCOUNTER — HOSPITAL ENCOUNTER (OUTPATIENT)
Dept: HOSPITAL 45 - C.LAB | Age: 73
Discharge: HOME | End: 2018-04-23
Attending: INTERNAL MEDICINE
Payer: COMMERCIAL

## 2018-04-23 DIAGNOSIS — I10: Primary | ICD-10-CM

## 2018-04-23 LAB
BUN SERPL-MCNC: 18 MG/DL (ref 7–18)
CALCIUM SERPL-MCNC: 9.4 MG/DL (ref 8.5–10.1)
CO2 SERPL-SCNC: 26 MMOL/L (ref 21–32)
CREAT SERPL-MCNC: 0.95 MG/DL (ref 0.6–1.2)
EOSINOPHIL NFR BLD AUTO: 286 K/UL (ref 130–400)
GLUCOSE SERPL-MCNC: 80 MG/DL (ref 70–99)
HCT VFR BLD CALC: 44.9 % (ref 37–47)
HGB BLD-MCNC: 15.1 G/DL (ref 12–16)
MCH RBC QN AUTO: 30 PG (ref 25–34)
MCHC RBC AUTO-ENTMCNC: 33.6 G/DL (ref 32–36)
MCV RBC AUTO: 89.3 FL (ref 80–100)
PMV BLD AUTO: 9.8 FL (ref 7.4–10.4)
POTASSIUM SERPL-SCNC: 4.2 MMOL/L (ref 3.5–5.1)
RED CELL DISTRIBUTION WIDTH CV: 13.8 % (ref 11.5–14.5)
RED CELL DISTRIBUTION WIDTH SD: 45 FL (ref 36.4–46.3)
SODIUM SERPL-SCNC: 138 MMOL/L (ref 136–145)
WBC # BLD AUTO: 6.19 K/UL (ref 4.8–10.8)